# Patient Record
Sex: MALE | Race: BLACK OR AFRICAN AMERICAN | NOT HISPANIC OR LATINO | Employment: UNEMPLOYED | URBAN - METROPOLITAN AREA
[De-identification: names, ages, dates, MRNs, and addresses within clinical notes are randomized per-mention and may not be internally consistent; named-entity substitution may affect disease eponyms.]

---

## 2024-07-11 ENCOUNTER — HOSPITAL ENCOUNTER (EMERGENCY)
Facility: HOSPITAL | Age: 24
Discharge: LEFT BEFORE DISCHARGE | End: 2024-07-11

## 2024-07-11 VITALS
HEART RATE: 67 BPM | SYSTOLIC BLOOD PRESSURE: 137 MMHG | HEIGHT: 68 IN | WEIGHT: 160 LBS | DIASTOLIC BLOOD PRESSURE: 78 MMHG | RESPIRATION RATE: 16 BRPM | BODY MASS INDEX: 24.25 KG/M2 | TEMPERATURE: 98 F | OXYGEN SATURATION: 99 %

## 2024-07-11 PROCEDURE — 99282 EMERGENCY DEPT VISIT SF MDM: CPT

## 2024-07-11 PROCEDURE — 25000003 PHARM REV CODE 250: Performed by: NURSE PRACTITIONER

## 2024-07-11 RX ORDER — TETRACAINE HYDROCHLORIDE 5 MG/ML
2 SOLUTION OPHTHALMIC
Status: DISCONTINUED | OUTPATIENT
Start: 2024-07-11 | End: 2024-07-12 | Stop reason: HOSPADM

## 2024-07-12 ENCOUNTER — HOSPITAL ENCOUNTER (EMERGENCY)
Facility: HOSPITAL | Age: 24
Discharge: HOME | End: 2024-07-13
Attending: EMERGENCY MEDICINE

## 2024-07-12 VITALS
SYSTOLIC BLOOD PRESSURE: 134 MMHG | TEMPERATURE: 98 F | HEART RATE: 58 BPM | WEIGHT: 160 LBS | OXYGEN SATURATION: 100 % | BODY MASS INDEX: 24.25 KG/M2 | DIASTOLIC BLOOD PRESSURE: 76 MMHG | HEIGHT: 68 IN | RESPIRATION RATE: 16 BRPM

## 2024-07-12 DIAGNOSIS — H10.31 ACUTE CONJUNCTIVITIS OF RIGHT EYE, UNSPECIFIED ACUTE CONJUNCTIVITIS TYPE: Primary | ICD-10-CM

## 2024-07-12 DIAGNOSIS — S05.01XA ABRASION OF RIGHT CORNEA, INITIAL ENCOUNTER: ICD-10-CM

## 2024-07-12 PROCEDURE — 99283 EMERGENCY DEPT VISIT LOW MDM: CPT

## 2024-07-12 PROCEDURE — 25000003 PHARM REV CODE 250

## 2024-07-12 RX ORDER — TETRACAINE HYDROCHLORIDE 5 MG/ML
2 SOLUTION OPHTHALMIC
Status: COMPLETED | OUTPATIENT
Start: 2024-07-12 | End: 2024-07-12

## 2024-07-12 RX ADMIN — FLUORESCEIN SODIUM 1 EACH: 1 STRIP OPHTHALMIC at 11:07

## 2024-07-12 RX ADMIN — TETRACAINE HYDROCHLORIDE 2 DROP: 5 SOLUTION OPHTHALMIC at 11:07

## 2024-07-12 NOTE — FIRST PROVIDER EVALUATION
Emergency Department TeleTriage Encounter Note      CHIEF COMPLAINT    Chief Complaint   Patient presents with    Eye Drainage     Right eye redness, drainage, pain x3 days       VITAL SIGNS   Initial Vitals [07/11/24 2008]   BP Pulse Resp Temp SpO2   137/78 67 16 98.1 °F (36.7 °C) 99 %      MAP       --            ALLERGIES    Review of patient's allergies indicates:  No Known Allergies    PROVIDER TRIAGE NOTE  Verbal consent for the teletriage evaluation was given by the patient at the start of the evaluation.  All efforts will be made to maintain patient's privacy during the evaluation.      This is a teletriage evaluation of a 23 y.o. male presenting to the ED with c/o right eye drainage and redness for 3 days. Limited physical exam via telehealth: The patient is awake, alert, answering questions appropriately and is not in respiratory distress.  As the Teletriage provider, I performed an initial assessment and ordered appropriate labs and imaging studies, if any, to facilitate the patient's care once placed in the ED. Once a room is available, care and a full evaluation will be completed by an alternate ED provider.  Any additional orders and the final disposition will be determined by that provider.  All imaging and labs will not be followed-up by the Teletriage Team, including myself.          ORDERS  Labs Reviewed - No data to display    ED Orders (720h ago, onward)      Start Ordered     Status Ordering Provider    07/11/24 2015 07/11/24 2012  fluorescein ophthalmic strip 1 each  ED 1 Time         Ordered VINAYAK DUKE    07/11/24 2015 07/11/24 2012  TETRAcaine HCl (PF) 0.5 % Drop 2 drop  ED 1 Time         Ordered VINAYAK DUKE    07/11/24 2012 07/11/24 2012  Visual acuity screening  Once         Ordered VINAYAK DUKE              Virtual Visit Note: The provider triage portion of this emergency department evaluation and documentation was performed via [employer], a HIPAA-compliant telemedicine  application, in concert with a tele-presenter in the room. A face to face patient evaluation with one of my colleagues will occur once the patient is placed in an emergency department room.      DISCLAIMER: This note was prepared with [employer] voice recognition transcription software. Garbled syntax, mangled pronouns, and other bizarre constructions may be attributed to that software system.

## 2024-07-12 NOTE — Clinical Note
"Wm "Wmcris Lara was seen and treated in our emergency department on 7/12/2024.  He may return to work on 07/15/2024.       If you have any questions or concerns, please don't hesitate to call.      Socorro Tejeda NP"

## 2024-07-13 RX ORDER — POLYMYXIN B SULFATE AND TRIMETHOPRIM 1; 10000 MG/ML; [USP'U]/ML
1 SOLUTION OPHTHALMIC 4 TIMES DAILY
Qty: 10 ML | Refills: 0 | Status: SHIPPED | OUTPATIENT
Start: 2024-07-12 | End: 2024-07-19

## 2024-07-13 NOTE — DISCHARGE INSTRUCTIONS
Please use the antibacterial eyedrops as prescribed for 7 days.  Please follow up with [facility] for primary care and further evaluation and rechecked.  Please return to the ED for worsening eye pain, changes in vision, eye swelling, fever, or any new or worsening concerns.

## 2024-07-13 NOTE — FIRST PROVIDER EVALUATION
Emergency Department TeleTriage Encounter Note      CHIEF COMPLAINT    Chief Complaint   Patient presents with    Eye Problem     RT X 4 DAYS, ITCHY        VITAL SIGNS   Initial Vitals [07/12/24 1826]   BP Pulse Resp Temp SpO2   134/76 (!) 58 16 98.2 °F (36.8 °C) 100 %      MAP       --            ALLERGIES    Review of patient's allergies indicates:  No Known Allergies    PROVIDER TRIAGE NOTE  This is a teletriage evaluation of a 23 y.o. male presenting to the ED complaining of eye problem. Patient reports right eye itching, redness, and pain for 4 days.     Patient is alert and oriented. He speaks in complete sentences. He is sitting upright in the chair in no distress. Right eye erythema noted.    Initial orders will be placed and care will be transferred to an alternate provider when patient is roomed for a full evaluation. Any additional orders and the final disposition will be determined by that provider.         ORDERS  Labs Reviewed - No data to display    ED Orders (720h ago, onward)      Start Ordered     Status Ordering Provider    07/12/24 1908 07/12/24 1907  Visual acuity screening  Once         Ordered JES MALIK              Virtual Visit Note: The provider triage portion of this emergency department evaluation and documentation was performed via [employer], a HIPAA-compliant telemedicine application, in concert with a tele-presenter in the room. A face to face patient evaluation with one of my colleagues will occur once the patient is placed in an emergency department room.      DISCLAIMER: This note was prepared with [employer] voice recognition transcription software. Garbled syntax, mangled pronouns, and other bizarre constructions may be attributed to that software system.

## 2024-07-13 NOTE — ED PROVIDER NOTES
Encounter Date: 7/12/2024       History     Chief Complaint   Patient presents with    Eye Problem     RT X 4 DAYS, ITCHY      Patient is a 23 y.o. male with no significant past medical history who presents to ED via self for concern for right eye redness which began 4 day(s) ago.  Patient reports for the last 4 days he was had right eye itchiness and redness with green drainage.  Patient reports he was albumin 2 chat when he wakes up in the morning.  Patient reports he 1st noticed the redness when he was at work.  Patient denies knowing if anything got in his eye at work.  Patient reports he wears safety goggles at work.  Patient reports he does not use contacts or glasses.  Patient denies any vision changes.  Patient denies any ear pain or sore throat, cough, congestion, or runny nose.  Patient denies fever.  Patient is awake and alert in no acute distress.         Review of patient's allergies indicates:  No Known Allergies  History reviewed. No pertinent past medical history.  History reviewed. No pertinent surgical history.  No family history on file.     Review of Systems   Constitutional: Negative.  Negative for fever.   HENT: Negative.     Eyes:  Positive for pain, discharge, redness and itching. Negative for photophobia and visual disturbance.   Respiratory: Negative.  Negative for shortness of breath.    Cardiovascular: Negative.  Negative for chest pain.   Gastrointestinal: Negative.    Genitourinary: Negative.    Musculoskeletal: Negative.  Negative for back pain, neck pain and neck stiffness.   Skin: Negative.  Negative for color change, pallor and rash.   Neurological: Negative.  Negative for weakness.   Hematological:  Does not bruise/bleed easily.   Psychiatric/Behavioral: Negative.         Physical Exam     Initial Vitals [07/12/24 1826]   BP Pulse Resp Temp SpO2   134/76 (!) 58 16 98.2 °F (36.8 °C) 100 %      MAP       --         Physical Exam    Nursing note and vitals reviewed.  Constitutional:  He appears well-developed and well-nourished. He is not diaphoretic. No distress.   HENT:   Head: Normocephalic and atraumatic.   Right Ear: External ear normal.   Left Ear: External ear normal.   Eyes: EOM and lids are normal. Pupils are equal, round, and reactive to light. Lids are everted and swept, no foreign bodies found. Right eye exhibits no discharge and no exudate. No foreign body present in the right eye. Left eye exhibits no discharge and no exudate. No foreign body present in the left eye. Right conjunctiva is injected. Left conjunctiva is not injected. Left conjunctiva has no hemorrhage.   Slit lamp exam:       The right eye shows corneal abrasion and fluorescein uptake.   Neck:   Normal range of motion.  Cardiovascular:  Normal rate, regular rhythm, normal heart sounds and intact distal pulses.     Exam reveals no gallop and no friction rub.       No murmur heard.  Pulmonary/Chest: Breath sounds normal. No respiratory distress. He has no wheezes. He has no rhonchi. He has no rales. He exhibits no tenderness.   Musculoskeletal:         General: Normal range of motion.      Cervical back: Normal range of motion.     Neurological: He is alert and oriented to person, place, and time. He has normal strength. GCS score is 15. GCS eye subscore is 4. GCS verbal subscore is 5. GCS motor subscore is 6.   Skin: Skin is warm and dry. Capillary refill takes less than 2 seconds.   Psychiatric: He has a normal mood and affect. His behavior is normal. Judgment and thought content normal.         ED Course   Procedures  Labs Reviewed - No data to display       Imaging Results    None          Medications   fluorescein ophthalmic strip 1 each (1 each Right Eye Given 7/12/24 2345)   TETRAcaine HCl (PF) 0.5 % Drop 2 drop (2 drops Right Eye Given 7/12/24 2347)     Medical Decision Making  MDM    Patient presents for emergent evaluation of acute eye irritation that poses a possible threat to life and/or bodily function.     Differential diagnosis included but not limited to conjunctivitis, corneal abrasion, foreign body.  In the ED patient found to have acute clear lung sounds bilaterally with no increased work of breathing.  Patient noted to have erythematous right conjunctiva on exam.  Eyelids everted and swept with no signs of foreign body.  Fluorescein stain reveals possible corneal abrasion at the 12 o'clock position.  Patient was visual acuity within normal limits..      Discharge MDM  I discussed the patient presentation, with my attending Dr. Aden who individually evaluated patient.    Patient was managed in the ED with eye exam.    The response to treatment was good.  Patient was discharged with antibiotic eyedrops.  Patient was discharged in stable condition with close follow up.  Detailed return precautions discussed to return to the ED for any new or worsening concerns.  Patient verbalizes understanding.    NP uses Epic and voice recognition software prone to occasional and minor errors that may persist in the medical record.     Risk  OTC drugs.  Prescription drug management.                                      Clinical Impression:  Final diagnoses:  [H10.31] Acute conjunctivitis of right eye, unspecified acute conjunctivitis type (Primary)  [S05.01XA] Abrasion of right cornea, initial encounter          ED Disposition Condition    Discharge Stable          ED Prescriptions       Medication Sig Dispense Start Date End Date Auth. Provider    polymyxin B sulf-trimethoprim (POLYTRIM) 10,000 unit- 1 mg/mL Drop Place 1 drop into the right eye 4 (four) times daily. for 7 days 10 mL 7/12/2024 7/19/2024 Socorro Tejeda NP          Follow-up Information       Follow up With Specialties Details Why Contact Info Additional Information    [facility]  Schedule an appointment as soon as possible for a visit  For primary care, For recheck/continuing care [place]  [id number]  [phone]       [facility]  Schedule an appointment as soon as possible for a visit   [phone] [address] [place] [address]     [facility] - Emergency Dept Emergency Medicine  If symptoms worsen [address]  [facility] [id number]  [phone] 1st floor             Power, Socorro QUESADA NP  07/13/24 0038

## 2025-08-06 ENCOUNTER — OFFICE VISIT (OUTPATIENT)
Dept: FAMILY MEDICINE | Facility: CLINIC | Age: 25
End: 2025-08-06
Payer: COMMERCIAL

## 2025-08-06 ENCOUNTER — LAB VISIT (OUTPATIENT)
Dept: LAB | Facility: HOSPITAL | Age: 25
End: 2025-08-06
Attending: NURSE PRACTITIONER
Payer: COMMERCIAL

## 2025-08-06 VITALS
HEART RATE: 56 BPM | BODY MASS INDEX: 23.52 KG/M2 | HEIGHT: 68 IN | TEMPERATURE: 98 F | WEIGHT: 155.19 LBS | DIASTOLIC BLOOD PRESSURE: 70 MMHG | SYSTOLIC BLOOD PRESSURE: 120 MMHG | OXYGEN SATURATION: 98 %

## 2025-08-06 DIAGNOSIS — Z13.1 DIABETES MELLITUS SCREENING: ICD-10-CM

## 2025-08-06 DIAGNOSIS — Z11.4 ENCOUNTER FOR SCREENING FOR HIV: ICD-10-CM

## 2025-08-06 DIAGNOSIS — Z13.220 LIPID SCREENING: ICD-10-CM

## 2025-08-06 DIAGNOSIS — Z11.3 SCREEN FOR STD (SEXUALLY TRANSMITTED DISEASE): ICD-10-CM

## 2025-08-06 DIAGNOSIS — Z20.2 STD EXPOSURE: ICD-10-CM

## 2025-08-06 DIAGNOSIS — Z13.1 DIABETES MELLITUS SCREENING: Primary | ICD-10-CM

## 2025-08-06 LAB
ALBUMIN SERPL BCP-MCNC: 4.3 G/DL (ref 3.5–5.2)
ALP SERPL-CCNC: 80 UNIT/L (ref 40–150)
ALT SERPL W/O P-5'-P-CCNC: 25 UNIT/L (ref 0–55)
ANION GAP (OHS): 8 MMOL/L (ref 8–16)
AST SERPL-CCNC: 26 UNIT/L (ref 0–50)
BILIRUB SERPL-MCNC: 0.3 MG/DL (ref 0.1–1)
BUN SERPL-MCNC: 10 MG/DL (ref 6–20)
CALCIUM SERPL-MCNC: 9.5 MG/DL (ref 8.7–10.5)
CHLORIDE SERPL-SCNC: 108 MMOL/L (ref 95–110)
CHOLEST SERPL-MCNC: 152 MG/DL (ref 120–199)
CHOLEST/HDLC SERPL: 4.5 {RATIO} (ref 2–5)
CO2 SERPL-SCNC: 24 MMOL/L (ref 23–29)
CREAT SERPL-MCNC: 1.2 MG/DL (ref 0.5–1.4)
GFR SERPLBLD CREATININE-BSD FMLA CKD-EPI: >60 ML/MIN/1.73/M2
GLUCOSE SERPL-MCNC: 86 MG/DL (ref 70–110)
HAV IGM SERPL QL IA: NORMAL
HBV CORE IGM SERPL QL IA: NORMAL
HBV SURFACE AG SERPL QL IA: NORMAL
HCV AB SERPL QL IA: NORMAL
HDLC SERPL-MCNC: 34 MG/DL (ref 40–75)
HDLC SERPL: 22.4 % (ref 20–50)
HIV 1+2 AB+HIV1 P24 AG SERPL QL IA: NORMAL
LDLC SERPL CALC-MCNC: 104.4 MG/DL (ref 63–159)
NONHDLC SERPL-MCNC: 118 MG/DL
POTASSIUM SERPL-SCNC: 4.2 MMOL/L (ref 3.5–5.1)
PROT SERPL-MCNC: 7.3 GM/DL (ref 6–8.4)
SODIUM SERPL-SCNC: 140 MMOL/L (ref 136–145)
T PALLIDUM IGG+IGM SER QL: NORMAL
TRIGL SERPL-MCNC: 68 MG/DL (ref 30–150)

## 2025-08-06 PROCEDURE — 87563 M. GENITALIUM AMP PROBE: CPT

## 2025-08-06 PROCEDURE — 86593 SYPHILIS TEST NON-TREP QUANT: CPT

## 2025-08-06 PROCEDURE — 99203 OFFICE O/P NEW LOW 30 MIN: CPT | Mod: S$GLB,,, | Performed by: NURSE PRACTITIONER

## 2025-08-06 PROCEDURE — 80074 ACUTE HEPATITIS PANEL: CPT

## 2025-08-06 PROCEDURE — 99999 PR PBB SHADOW E&M-EST. PATIENT-LVL III: CPT | Mod: PBBFAC,,, | Performed by: NURSE PRACTITIONER

## 2025-08-06 PROCEDURE — 3078F DIAST BP <80 MM HG: CPT | Mod: CPTII,S$GLB,, | Performed by: NURSE PRACTITIONER

## 2025-08-06 PROCEDURE — 80053 COMPREHEN METABOLIC PANEL: CPT

## 2025-08-06 PROCEDURE — 87389 HIV-1 AG W/HIV-1&-2 AB AG IA: CPT

## 2025-08-06 PROCEDURE — 1160F RVW MEDS BY RX/DR IN RCRD: CPT | Mod: CPTII,S$GLB,, | Performed by: NURSE PRACTITIONER

## 2025-08-06 PROCEDURE — 36415 COLL VENOUS BLD VENIPUNCTURE: CPT | Mod: PN

## 2025-08-06 PROCEDURE — 1159F MED LIST DOCD IN RCRD: CPT | Mod: CPTII,S$GLB,, | Performed by: NURSE PRACTITIONER

## 2025-08-06 PROCEDURE — 87798 DETECT AGENT NOS DNA AMP: CPT

## 2025-08-06 PROCEDURE — 3008F BODY MASS INDEX DOCD: CPT | Mod: CPTII,S$GLB,, | Performed by: NURSE PRACTITIONER

## 2025-08-06 PROCEDURE — 3074F SYST BP LT 130 MM HG: CPT | Mod: CPTII,S$GLB,, | Performed by: NURSE PRACTITIONER

## 2025-08-06 PROCEDURE — 80061 LIPID PANEL: CPT

## 2025-08-06 PROCEDURE — 87591 N.GONORRHOEAE DNA AMP PROB: CPT

## 2025-08-06 NOTE — PROGRESS NOTES
Subjective:       Patient ID: Wm Lara is a 24 y.o. male.    Chief Complaint: STD CHECK and mycoplasma    History of Present Illness    CHIEF COMPLAINT:  Patient presents for STI testing after his sexual partner tested positive for mycoplasma and ureaplasma.    HPI:  Patient reports his sexual partner recently tested positive for mycoplasma genitalium and ureaplasma, with symptoms including vaginal discharge, pain, and burning. Patient completed a 14-day course of antibiotics prescribed by his partner's doctor, including doxycycline and moxifloxacin. His partner subsequently tested positive for a different type of mycoplasma after completing treatment. He and his partner had recently resumed sexual activity just before she discovered the infection. He is not using barrier protection. Patient denies any personal symptoms such as penile discharge, pain with urination, or symptoms of chlamydia or gonorrhea. Patient also mentions a family history of diabetes, noting that his father had diabetes but recently managed to discontinue insulin use after approximately 80 months of treatment.    MEDICATIONS:  Patient is on a 14-day course of Doxycycline and Moxifloxacin for the treatment of mycoplasma genitalium.    FAMILY HISTORY:  Family history is significant for father with diabetes.    SOCIAL HISTORY:  Occupation: Employed      ROS:  General: -fever, -chills, -fatigue, -weight gain, -weight loss  Eyes: -vision changes, -redness, -discharge  ENT: -ear pain, -nasal congestion, -sore throat  Cardiovascular: -chest pain, -palpitations, -lower extremity edema  Respiratory: -cough, -shortness of breath  Gastrointestinal: -abdominal pain, -nausea, -vomiting, -diarrhea, -constipation, -blood in stool  Genitourinary: -dysuria, -hematuria, -frequency  Musculoskeletal: -joint pain, -muscle pain  Skin: -rash, -lesion  Neurological: -headache, -dizziness, -numbness, -tingling  Psychiatric: -anxiety, -depression, -sleep  "difficulty         History reviewed. No pertinent past medical history.     History reviewed. No pertinent surgical history.    Family History   Problem Relation Name Age of Onset    No Known Problems Mother      Diabetes Father      Hypertension Father         Social History     Socioeconomic History    Marital status: Single   Tobacco Use    Smoking status: Every Day     Current packs/day: 0.50     Average packs/day: 0.5 packs/day for 7.6 years (3.8 ttl pk-yrs)     Types: Cigarettes     Start date: 2018    Smokeless tobacco: Never   Substance and Sexual Activity    Alcohol use: Not Currently    Drug use: Not Currently    Sexual activity: Yes     Partners: Female       Current Medications[1]    Review of patient's allergies indicates:  No Known Allergies  Objective:      Blood pressure 120/70, pulse (!) 56, temperature 98.3 °F (36.8 °C), height 5' 8" (1.727 m), weight 70.4 kg (155 lb 3.3 oz), SpO2 98%. Body mass index is 23.6 kg/m².   Physical Exam    General: No acute distress. Well-developed. Well-nourished. BMI 23.60  Eyes: EOMI. Sclerae anicteric.  HENT: Normocephalic. Atraumatic. Nares patent. Moist oral mucosa.  Cardiovascular: Regular rate. Regular rhythm. No murmurs. No rubs. No gallops. Normal S1, S2.  Respiratory: Normal respiratory effort. Clear to auscultation bilaterally. No rales. No rhonchi. No wheezing.  Abdomen: Soft. Non-tender. Non-distended.   Musculoskeletal: No  obvious deformity.  Extremities: No lower extremity edema.  Neurological: Alert & oriented x3. No slurred speech. Normal gait.  Psychiatric: Normal mood. Normal affect. Good insight. Good judgment.  Skin: Warm. Dry. No rash.         Assessment:       Assessment & Plan    - Partner tested positive for mycoplasma genitalium; previously treated with Doxycycline and Moxifloxacin.    EXPOSURE TO COMMUNICABLE DISEASES:  - Noted that the patient's partner tested for mycoplasmas and other infections.  - Partner reported symptoms including " vaginal discharge, pain, and burning, suggesting potential exposure to communicable diseases.  - Ordered testing for mycoplasmas and other infections via urine sample.    EXPOSURE TO SEXUALLY TRANSMITTED INFECTIONS:  - Patient has been sexually active with a partner who tested positive for mycoplasmas.  - Explained mycoplasma transmission occurs through sexual contact and men can be asymptomatic carriers.  - Informed about the risk of re-infection if both partners are not treated simultaneously.  - Ordered comprehensive STI panel including mycoplasma and ureaplasma testing via urine sample.  - Additional labs ordered for HIV, hepatitis, and syphilis screening.    FAMILY HISTORY OF DIABETES:  - Documented that father had diabetes requiring insulin treatment but managed to discontinue insulin after 80 months.  - Discussed potential implications of this family history for the patient's health.  - Ordered fasting labs for glucose, electrolytes, and cholesterol to screen for diabetes risk factors.    FOLLOW-UP:  - Scheduled follow up in 1-3 days to review test results.  - Instructed patient to check MyChart for test result notifications.       Plan:       Wm was seen today for std check and mycoplasma.    Diagnoses and all orders for this visit:    Diabetes mellitus screening  -     Comprehensive Metabolic Panel; Future    Encounter for screening for HIV  -     HIV 1/2 Ag/Ab (4th Gen); Future    STD exposure  -     C. trachomatis/N. gonorrhoeae by AMP DNA; Future  -     Hepatitis Panel, Acute; Future  -     HIV 1/2 Ag/Ab (4th Gen); Future  -     Ureaplasma PCR; Future  -     Mycoplasma genitalium Molecular Detection, PCR Urine; Future  -     Mycoplasma hominus Molecular detection PCR; Future  -     Treponema Pallidium Antibodies IgG, IgM; Future    Screen for STD (sexually transmitted disease)  -     C. trachomatis/N. gonorrhoeae by AMP DNA; Future  -     Hepatitis Panel, Acute; Future  -     HIV 1/2 Ag/Ab (4th  Gen); Future  -     Ureaplasma PCR; Future  -     Mycoplasma genitalium Molecular Detection, PCR Urine; Future  -     Mycoplasma hominus Molecular detection PCR; Future  -     Treponema Pallidium Antibodies IgG, IgM; Future    Lipid screening  -     Lipid Panel; Future           This note was generated with the assistance of ambient listening technology. Verbal consent was obtained by the patient and accompanying visitor(s) for the recording of patient appointment to facilitate this note. I attest to having reviewed and edited the generated note for accuracy, though some syntax or spelling errors may persist. Please contact the author of this note for any clarification.             [1]   No current outpatient medications on file.     No current facility-administered medications for this visit.

## 2025-08-08 LAB
C TRACH DNA SPEC QL NAA+PROBE: NOT DETECTED
CTGC SOURCE (OHS) ORD-325: NORMAL
MYCOPLASMA GENITALIUM RESULT: NEGATIVE
N GONORRHOEA DNA UR QL NAA+PROBE: NOT DETECTED
SPECIMEN SOURCE: NORMAL

## 2025-08-10 LAB
M HOMINIS DNA SPEC QL NAA+PROBE: POSITIVE
SPECIMEN SOURCE: ABNORMAL

## 2025-08-11 LAB
SPECIMEN SOURCE: ABNORMAL
U PARVUM DNA SPEC QL NAA+PROBE: POSITIVE
U UREALYTICUM DNA SPEC QL NAA+PROBE: NEGATIVE

## 2025-08-14 ENCOUNTER — PATIENT MESSAGE (OUTPATIENT)
Dept: FAMILY MEDICINE | Facility: CLINIC | Age: 25
End: 2025-08-14
Payer: COMMERCIAL

## 2025-08-14 DIAGNOSIS — N73.9 PELVIC INFLAMMATORY DISEASE DUE TO MYCOPLASMA HOMINIS: ICD-10-CM

## 2025-08-14 DIAGNOSIS — B96.89 PELVIC INFLAMMATORY DISEASE DUE TO MYCOPLASMA HOMINIS: ICD-10-CM

## 2025-08-14 DIAGNOSIS — A49.3 NONGONOCOCCAL URETHRITIS DUE TO UREAPLASMA UREALYTICUM: Primary | ICD-10-CM

## 2025-08-14 DIAGNOSIS — N34.1 NONGONOCOCCAL URETHRITIS DUE TO UREAPLASMA UREALYTICUM: Primary | ICD-10-CM

## 2025-08-14 RX ORDER — DOXYCYCLINE HYCLATE 100 MG
100 TABLET ORAL 2 TIMES DAILY
Qty: 20 TABLET | Refills: 0 | Status: SHIPPED | OUTPATIENT
Start: 2025-08-14

## 2025-08-22 ENCOUNTER — PATIENT MESSAGE (OUTPATIENT)
Dept: FAMILY MEDICINE | Facility: CLINIC | Age: 25
End: 2025-08-22
Payer: COMMERCIAL

## 2025-08-25 ENCOUNTER — TELEPHONE (OUTPATIENT)
Dept: FAMILY MEDICINE | Facility: CLINIC | Age: 25
End: 2025-08-25
Payer: COMMERCIAL

## 2025-08-25 DIAGNOSIS — A49.3 NONGONOCOCCAL URETHRITIS DUE TO UREAPLASMA UREALYTICUM: ICD-10-CM

## 2025-08-25 DIAGNOSIS — B96.89 PELVIC INFLAMMATORY DISEASE DUE TO MYCOPLASMA HOMINIS: ICD-10-CM

## 2025-08-25 DIAGNOSIS — N34.1 NONGONOCOCCAL URETHRITIS DUE TO UREAPLASMA UREALYTICUM: ICD-10-CM

## 2025-08-25 DIAGNOSIS — N73.9 PELVIC INFLAMMATORY DISEASE DUE TO MYCOPLASMA HOMINIS: ICD-10-CM

## 2025-08-25 RX ORDER — DOXYCYCLINE HYCLATE 100 MG
100 TABLET ORAL 2 TIMES DAILY
Qty: 20 TABLET | Refills: 0 | Status: SHIPPED | OUTPATIENT
Start: 2025-08-25